# Patient Record
Sex: FEMALE | Race: BLACK OR AFRICAN AMERICAN
[De-identification: names, ages, dates, MRNs, and addresses within clinical notes are randomized per-mention and may not be internally consistent; named-entity substitution may affect disease eponyms.]

---

## 2022-05-12 ENCOUNTER — APPOINTMENT (OUTPATIENT)
Dept: PEDIATRIC ALLERGY IMMUNOLOGY | Facility: CLINIC | Age: 22
End: 2022-05-12
Payer: MEDICAID

## 2022-05-12 VITALS — WEIGHT: 101 LBS | HEIGHT: 64 IN | BODY MASS INDEX: 17.24 KG/M2

## 2022-05-12 PROBLEM — Z00.00 ENCOUNTER FOR PREVENTIVE HEALTH EXAMINATION: Status: ACTIVE | Noted: 2022-05-12

## 2022-05-12 PROCEDURE — 99203 OFFICE O/P NEW LOW 30 MIN: CPT

## 2022-05-12 NOTE — REVIEW OF SYSTEMS
[Eye Itching] : itchy eyes [Nasal Congestion] : nasal congestion [Sneezing] : sneezing [Nl] : Genitourinary

## 2022-05-12 NOTE — PHYSICAL EXAM
[Alert] : alert [Well Nourished] : well nourished [Healthy Appearance] : healthy appearance [No Acute Distress] : no acute distress [Well Developed] : well developed [Normal Pupil & Iris Size/Symmetry] : normal pupil and iris size and symmetry [No Discharge] : no discharge [No Photophobia] : no photophobia [Sclera Not Icteric] : sclera not icteric [Normal TMs] : both tympanic membranes were normal [Normal Nasal Mucosa] : the nasal mucosa was normal [Normal Lips/Tongue] : the lips and tongue were normal [Normal Outer Ear/Nose] : the ears and nose were normal in appearance [Normal Tonsils] : normal tonsils [No Thrush] : no thrush [Supple] : the neck was supple [Normal Rate and Effort] : normal respiratory rhythm and effort [No Crackles] : no crackles [No Retractions] : no retractions [Bilateral Audible Breath Sounds] : bilateral audible breath sounds [Normal Rate] : heart rate was normal  [Normal S1, S2] : normal S1 and S2 [No murmur] : no murmur [Regular Rhythm] : with a regular rhythm [Soft] : abdomen soft [Not Tender] : non-tender [Not Distended] : not distended [No HSM] : no hepato-splenomegaly [Normal Cervical Lymph Nodes] : cervical [Skin Intact] : skin intact  [No Rash] : no rash [No Skin Lesions] : no skin lesions [No clubbing] : no clubbing [No Edema] : no edema [No Cyanosis] : no cyanosis [Normal Mood] : mood was normal [Normal Affect] : affect was normal [Alert, Awake, Oriented as Age-Appropriate] : alert, awake, oriented as age appropriate [Pale mucosa] : no pale mucosa [de-identified] : significant nasal blockage mostly on the right side.

## 2022-05-12 NOTE — ASSESSMENT
[FreeTextEntry1] : The patient is a 22 year old female with Allergic Rhinitis and Allergic Conjunctivitis. SHe has been taking Fexofenadine lately for her symptoms. I have recommended she stop Fexofenadine and use Nasocort nose spray 1 spray each nostril once a day. She will return in 1 week for environmental testing.

## 2022-05-12 NOTE — HISTORY OF PRESENT ILLNESS
[None] : The patient is currently asymptomatic [de-identified] : LIANET WOOD is a 22 year  old female with a history of sneezing, stuffy nose, itchy eyes an itchy nose. No wheezing no shortness of breath. She feels as though she can sneeze up to 25 times at a time. Her symptoms are year round with no seasonal variation. It is notable she has pets back at home. She travels from the  to [facility] every 6 months or so. She states her symptoms do not vary whether she is here or back at home. She is here for evaluation.

## 2022-05-19 ENCOUNTER — APPOINTMENT (OUTPATIENT)
Dept: PEDIATRIC ALLERGY IMMUNOLOGY | Facility: CLINIC | Age: 22
End: 2022-05-19
Payer: MEDICAID

## 2022-05-19 VITALS — HEIGHT: 64 IN | BODY MASS INDEX: 17.24 KG/M2 | WEIGHT: 101 LBS

## 2022-05-19 PROCEDURE — 95024 IQ TESTS W/ALLERGENIC XTRCS: CPT

## 2022-05-19 PROCEDURE — 95004 PERQ TESTS W/ALRGNC XTRCS: CPT

## 2022-05-19 NOTE — IMPRESSION
[Allergy Testing Trees] : trees [Allergy Testing Weeds] : weeds [Allergy Testing Grasses] : grasses [Allergy Testing Dust Mite] : dust mites [Allergy Testing Mixed Feathers] : feathers [Allergy Testing Cockroach] : cockroach [Allergy Testing Dog] : dog [Allergy Testing Cat] : cat [] : molds [FreeTextEntry3] : Cat, Dust Mites, Mold

## 2022-05-19 NOTE — HISTORY OF PRESENT ILLNESS
[de-identified] : LIANET WOOD is a 22 year old female with a history of sneezing, stuffy nose, itchy eyes an itchy nose. No wheezing no shortness of breath. She feels as though she can sneeze up to 25 times at a time. Her symptoms are year round with no seasonal variation. It is notable she has pets back at home. She travels from the  to [facility] every 6 months or so. She states her symptoms do not vary whether she is here or back at home. She is here for evaluation. \par

## 2022-05-19 NOTE — REASON FOR VISIT
[Routine Follow-Up] : a routine follow-up visit for [FreeTextEntry3] : Pt presents here today for further evaluation of allergies.

## 2022-06-09 ENCOUNTER — APPOINTMENT (OUTPATIENT)
Dept: PEDIATRIC ALLERGY IMMUNOLOGY | Facility: CLINIC | Age: 22
End: 2022-06-09

## 2023-05-25 ENCOUNTER — APPOINTMENT (OUTPATIENT)
Dept: PEDIATRIC ALLERGY IMMUNOLOGY | Facility: CLINIC | Age: 23
End: 2023-05-25
Payer: MEDICAID

## 2023-05-25 VITALS — BODY MASS INDEX: 17.24 KG/M2 | HEIGHT: 64 IN | WEIGHT: 101 LBS

## 2023-05-25 DIAGNOSIS — H10.13 ACUTE ATOPIC CONJUNCTIVITIS, BILATERAL: ICD-10-CM

## 2023-05-25 DIAGNOSIS — J30.9 ALLERGIC RHINITIS, UNSPECIFIED: ICD-10-CM

## 2023-05-25 PROCEDURE — 99213 OFFICE O/P EST LOW 20 MIN: CPT

## 2023-05-25 NOTE — HISTORY OF PRESENT ILLNESS
[None] : The patient is currently asymptomatic [de-identified] : LIANET WOOD is a 23 year  old female with a history of sneezing, stuffy nose, itchy eyes an itchy nose. No wheezing no shortness of breath. She feels as though she can sneeze up to 25 times at a time. Her symptoms are year round with no seasonal variation. It is notable she has pets back at home. She travels from the  to [facility] every 6 months or so. She states her symptoms do not vary whether she is here or back at home. She is here for evaluation.

## 2023-05-25 NOTE — PHYSICAL EXAM
[Alert] : alert [Well Nourished] : well nourished [Healthy Appearance] : healthy appearance [No Acute Distress] : no acute distress [Well Developed] : well developed [Normal Pupil & Iris Size/Symmetry] : normal pupil and iris size and symmetry [No Discharge] : no discharge [No Photophobia] : no photophobia [Sclera Not Icteric] : sclera not icteric [Supple] : the neck was supple [Normal Rate and Effort] : normal respiratory rhythm and effort [No Crackles] : no crackles [No Retractions] : no retractions [Bilateral Audible Breath Sounds] : bilateral audible breath sounds [Normal Rate] : heart rate was normal  [Normal S1, S2] : normal S1 and S2 [No murmur] : no murmur [Regular Rhythm] : with a regular rhythm [Soft] : abdomen soft [Not Tender] : non-tender [Not Distended] : not distended [No HSM] : no hepato-splenomegaly [Normal Cervical Lymph Nodes] : cervical [Skin Intact] : skin intact  [No Rash] : no rash [No Skin Lesions] : no skin lesions [No clubbing] : no clubbing [No Edema] : no edema [No Cyanosis] : no cyanosis [Normal Mood] : mood was normal [Normal Affect] : affect was normal [Alert, Awake, Oriented as Age-Appropriate] : alert, awake, oriented as age appropriate [de-identified] : significant nasal blockage mostly on the right side.

## 2023-05-25 NOTE — ASSESSMENT
[FreeTextEntry1] : The patient is a 23 year female with a history suggestive of Allergic Rhinitis. Allergy skin testing showed no significant positive reactions to environmental allergens, discussed avoidance measures including dust mite proof encasings and HEPA air purifier. I also discussed using oral antihistamines for symptomatic relief. I will see her mid June or as needed. \par

## 2023-06-22 ENCOUNTER — APPOINTMENT (OUTPATIENT)
Dept: PEDIATRIC ALLERGY IMMUNOLOGY | Facility: CLINIC | Age: 23
End: 2023-06-22

## 2024-12-17 ENCOUNTER — APPOINTMENT (OUTPATIENT)
Dept: PEDIATRIC ALLERGY IMMUNOLOGY | Facility: CLINIC | Age: 24
End: 2024-12-17
Payer: COMMERCIAL

## 2024-12-17 VITALS
SYSTOLIC BLOOD PRESSURE: 110 MMHG | DIASTOLIC BLOOD PRESSURE: 65 MMHG | HEIGHT: 64 IN | BODY MASS INDEX: 20.49 KG/M2 | WEIGHT: 120 LBS

## 2024-12-17 DIAGNOSIS — J30.89 OTHER ALLERGIC RHINITIS: ICD-10-CM

## 2024-12-17 DIAGNOSIS — H10.13 ACUTE ATOPIC CONJUNCTIVITIS, BILATERAL: ICD-10-CM

## 2024-12-17 PROCEDURE — 99214 OFFICE O/P EST MOD 30 MIN: CPT

## 2024-12-17 RX ORDER — FLUTICASONE PROPIONATE 50 UG/1
50 SPRAY, METERED NASAL DAILY
Qty: 1 | Refills: 2 | Status: ACTIVE | COMMUNITY
Start: 2024-12-17 | End: 1900-01-01

## 2024-12-17 RX ORDER — FEXOFENADINE HYDROCHLORIDE 180 MG/1
180 TABLET ORAL
Qty: 30 | Refills: 2 | Status: ACTIVE | COMMUNITY
Start: 2024-12-17 | End: 1900-01-01

## 2024-12-17 RX ORDER — AZELASTINE HYDROCHLORIDE 137 UG/1
0.1 SPRAY, METERED NASAL
Qty: 30 | Refills: 2 | Status: ACTIVE | COMMUNITY
Start: 2024-12-17 | End: 1900-01-01